# Patient Record
Sex: FEMALE | Race: BLACK OR AFRICAN AMERICAN | NOT HISPANIC OR LATINO | URBAN - METROPOLITAN AREA
[De-identification: names, ages, dates, MRNs, and addresses within clinical notes are randomized per-mention and may not be internally consistent; named-entity substitution may affect disease eponyms.]

---

## 2018-05-14 ENCOUNTER — OUTPATIENT (OUTPATIENT)
Dept: OUTPATIENT SERVICES | Facility: HOSPITAL | Age: 63
LOS: 1 days | End: 2018-05-14
Payer: COMMERCIAL

## 2018-05-14 DIAGNOSIS — Z22.321 CARRIER OR SUSPECTED CARRIER OF METHICILLIN SUSCEPTIBLE STAPHYLOCOCCUS AUREUS: ICD-10-CM

## 2018-05-14 LAB
MRSA PCR RESULT.: NEGATIVE — SIGNIFICANT CHANGE UP
S AUREUS DNA NOSE QL NAA+PROBE: NEGATIVE — SIGNIFICANT CHANGE UP

## 2018-05-14 PROCEDURE — 87641 MR-STAPH DNA AMP PROBE: CPT

## 2018-05-17 VITALS
HEART RATE: 64 BPM | SYSTOLIC BLOOD PRESSURE: 103 MMHG | DIASTOLIC BLOOD PRESSURE: 54 MMHG | OXYGEN SATURATION: 100 % | TEMPERATURE: 97 F | RESPIRATION RATE: 18 BRPM | WEIGHT: 169.32 LBS | HEIGHT: 65 IN

## 2018-05-17 NOTE — PATIENT PROFILE ADULT. - PMH
Hyperlipidemia    Hypothyroidism    Migraines    Osteoarthritis    Rotator cuff disorder  chronic injury Hyperlipidemia    Hypothyroidism    Migraines    Osteoarthritis    PUD (peptic ulcer disease)  with ugi blding  Rotator cuff disorder  chronic injury Hyperlipidemia    Hypothyroidism    Migraines    Osteoarthritis    PUD (peptic ulcer disease)  with ugi blding  Rotator cuff disorder  chronic injury left

## 2018-05-17 NOTE — PATIENT PROFILE ADULT. - PSH
History of cataract surgery    History of hemorrhoidectomy History of 2  sections    History of cataract surgery  bilateral  History of hemorrhoidectomy

## 2018-05-17 NOTE — H&P ADULT - HISTORY OF PRESENT ILLNESS
63F c/o right knee pain  Presents today for elective right TKR. 63F c/o right knee pain x chronic.   Has had conservative treatment but still symptomatic.  Presents today for elective right Total Knee Replacement surgery.    Independent ambulator. No numbness of LE. No other complaints.

## 2018-05-17 NOTE — H&P ADULT - NSHPLABSRESULTS_GEN_ALL_CORE
Preop cbc, bmp, pt/inr, ptt, ua wnl; nasal swab neg  preop ekg wnl per clearance   ct chest 10/2017 wnl per clearance

## 2018-05-17 NOTE — H&P ADULT - NSHPPHYSICALEXAM_GEN_ALL_CORE
Right knee decreased ROM secondary to pain  Rest of PE per MD clearance NAD  MSK:  Right knee decreased ROM secondary to pain  ehl, tib ant, GS 5/5 b/l  DP right 2+  gross sensation intact to light touch b/l LE  calf soft, compressible b/l LE    Rest of PE per MD clearance

## 2018-05-18 ENCOUNTER — INPATIENT (INPATIENT)
Facility: HOSPITAL | Age: 63
LOS: 3 days | Discharge: HOME HEALTH | DRG: 470 | End: 2018-05-22
Attending: SPECIALIST | Admitting: SPECIALIST
Payer: COMMERCIAL

## 2018-05-18 DIAGNOSIS — K27.9 PEPTIC ULCER, SITE UNSPECIFIED, UNSPECIFIED AS ACUTE OR CHRONIC, WITHOUT HEMORRHAGE OR PERFORATION: ICD-10-CM

## 2018-05-18 DIAGNOSIS — E03.9 HYPOTHYROIDISM, UNSPECIFIED: ICD-10-CM

## 2018-05-18 DIAGNOSIS — Z87.59 PERSONAL HISTORY OF OTHER COMPLICATIONS OF PREGNANCY, CHILDBIRTH AND THE PUERPERIUM: Chronic | ICD-10-CM

## 2018-05-18 DIAGNOSIS — G43.909 MIGRAINE, UNSPECIFIED, NOT INTRACTABLE, WITHOUT STATUS MIGRAINOSUS: ICD-10-CM

## 2018-05-18 DIAGNOSIS — M17.11 UNILATERAL PRIMARY OSTEOARTHRITIS, RIGHT KNEE: ICD-10-CM

## 2018-05-18 DIAGNOSIS — E78.5 HYPERLIPIDEMIA, UNSPECIFIED: ICD-10-CM

## 2018-05-18 DIAGNOSIS — Z98.49 CATARACT EXTRACTION STATUS, UNSPECIFIED EYE: Chronic | ICD-10-CM

## 2018-05-18 DIAGNOSIS — Z98.890 OTHER SPECIFIED POSTPROCEDURAL STATES: Chronic | ICD-10-CM

## 2018-05-18 PROCEDURE — 73560 X-RAY EXAM OF KNEE 1 OR 2: CPT | Mod: 26,RT

## 2018-05-18 RX ORDER — ONDANSETRON 8 MG/1
4 TABLET, FILM COATED ORAL EVERY 6 HOURS
Qty: 0 | Refills: 0 | Status: DISCONTINUED | OUTPATIENT
Start: 2018-05-18 | End: 2018-05-22

## 2018-05-18 RX ORDER — OXYCODONE HYDROCHLORIDE 5 MG/1
10 TABLET ORAL EVERY 4 HOURS
Qty: 0 | Refills: 0 | Status: DISCONTINUED | OUTPATIENT
Start: 2018-05-18 | End: 2018-05-18

## 2018-05-18 RX ORDER — CELECOXIB 200 MG/1
200 CAPSULE ORAL
Qty: 0 | Refills: 0 | Status: DISCONTINUED | OUTPATIENT
Start: 2018-05-20 | End: 2018-05-22

## 2018-05-18 RX ORDER — ACETAMINOPHEN 500 MG
650 TABLET ORAL EVERY 6 HOURS
Qty: 0 | Refills: 0 | Status: DISCONTINUED | OUTPATIENT
Start: 2018-05-18 | End: 2018-05-22

## 2018-05-18 RX ORDER — LEVOTHYROXINE SODIUM 125 MCG
112 TABLET ORAL DAILY
Qty: 0 | Refills: 0 | Status: DISCONTINUED | OUTPATIENT
Start: 2018-05-18 | End: 2018-05-22

## 2018-05-18 RX ORDER — OXYCODONE HYDROCHLORIDE 5 MG/1
10 TABLET ORAL EVERY 4 HOURS
Qty: 0 | Refills: 0 | Status: DISCONTINUED | OUTPATIENT
Start: 2018-05-18 | End: 2018-05-22

## 2018-05-18 RX ORDER — HYDROMORPHONE HYDROCHLORIDE 2 MG/ML
0.5 INJECTION INTRAMUSCULAR; INTRAVENOUS; SUBCUTANEOUS EVERY 4 HOURS
Qty: 0 | Refills: 0 | Status: DISCONTINUED | OUTPATIENT
Start: 2018-05-18 | End: 2018-05-18

## 2018-05-18 RX ORDER — HYDROMORPHONE HYDROCHLORIDE 2 MG/ML
0.5 INJECTION INTRAMUSCULAR; INTRAVENOUS; SUBCUTANEOUS
Qty: 0 | Refills: 0 | Status: DISCONTINUED | OUTPATIENT
Start: 2018-05-18 | End: 2018-05-22

## 2018-05-18 RX ORDER — MAGNESIUM HYDROXIDE 400 MG/1
30 TABLET, CHEWABLE ORAL DAILY
Qty: 0 | Refills: 0 | Status: DISCONTINUED | OUTPATIENT
Start: 2018-05-18 | End: 2018-05-22

## 2018-05-18 RX ORDER — OXYCODONE HYDROCHLORIDE 5 MG/1
5 TABLET ORAL EVERY 4 HOURS
Qty: 0 | Refills: 0 | Status: DISCONTINUED | OUTPATIENT
Start: 2018-05-18 | End: 2018-05-18

## 2018-05-18 RX ORDER — CEFAZOLIN SODIUM 1 G
2000 VIAL (EA) INJECTION EVERY 8 HOURS
Qty: 0 | Refills: 0 | Status: DISCONTINUED | OUTPATIENT
Start: 2018-05-18 | End: 2018-05-18

## 2018-05-18 RX ORDER — PANTOPRAZOLE SODIUM 20 MG/1
40 TABLET, DELAYED RELEASE ORAL DAILY
Qty: 0 | Refills: 0 | Status: DISCONTINUED | OUTPATIENT
Start: 2018-05-18 | End: 2018-05-22

## 2018-05-18 RX ORDER — CELECOXIB 200 MG/1
400 CAPSULE ORAL ONCE
Qty: 0 | Refills: 0 | Status: COMPLETED | OUTPATIENT
Start: 2018-05-18 | End: 2018-05-18

## 2018-05-18 RX ORDER — ACETAMINOPHEN 500 MG
650 TABLET ORAL EVERY 6 HOURS
Qty: 0 | Refills: 0 | Status: DISCONTINUED | OUTPATIENT
Start: 2018-05-18 | End: 2018-05-21

## 2018-05-18 RX ORDER — CEFAZOLIN SODIUM 1 G
2000 VIAL (EA) INJECTION EVERY 8 HOURS
Qty: 0 | Refills: 0 | Status: COMPLETED | OUTPATIENT
Start: 2018-05-18 | End: 2018-05-19

## 2018-05-18 RX ORDER — LEVOTHYROXINE SODIUM 125 MCG
1 TABLET ORAL
Qty: 0 | Refills: 0 | COMMUNITY

## 2018-05-18 RX ORDER — HYDROMORPHONE HYDROCHLORIDE 2 MG/ML
0.5 INJECTION INTRAMUSCULAR; INTRAVENOUS; SUBCUTANEOUS EVERY 4 HOURS
Qty: 0 | Refills: 0 | Status: DISCONTINUED | OUTPATIENT
Start: 2018-05-18 | End: 2018-05-22

## 2018-05-18 RX ORDER — POLYETHYLENE GLYCOL 3350 17 G/17G
17 POWDER, FOR SOLUTION ORAL DAILY
Qty: 0 | Refills: 0 | Status: DISCONTINUED | OUTPATIENT
Start: 2018-05-18 | End: 2018-05-22

## 2018-05-18 RX ORDER — SENNA PLUS 8.6 MG/1
2 TABLET ORAL AT BEDTIME
Qty: 0 | Refills: 0 | Status: DISCONTINUED | OUTPATIENT
Start: 2018-05-18 | End: 2018-05-22

## 2018-05-18 RX ORDER — ASPIRIN/CALCIUM CARB/MAGNESIUM 324 MG
325 TABLET ORAL
Qty: 0 | Refills: 0 | Status: DISCONTINUED | OUTPATIENT
Start: 2018-05-18 | End: 2018-05-22

## 2018-05-18 RX ORDER — DOCUSATE SODIUM 100 MG
100 CAPSULE ORAL THREE TIMES A DAY
Qty: 0 | Refills: 0 | Status: DISCONTINUED | OUTPATIENT
Start: 2018-05-18 | End: 2018-05-22

## 2018-05-18 RX ORDER — OXYCODONE HYDROCHLORIDE 5 MG/1
5 TABLET ORAL EVERY 4 HOURS
Qty: 0 | Refills: 0 | Status: DISCONTINUED | OUTPATIENT
Start: 2018-05-18 | End: 2018-05-22

## 2018-05-18 RX ORDER — OXYCODONE HYDROCHLORIDE 5 MG/1
20 TABLET ORAL ONCE
Qty: 0 | Refills: 0 | Status: DISCONTINUED | OUTPATIENT
Start: 2018-05-18 | End: 2018-05-18

## 2018-05-18 RX ORDER — SODIUM CHLORIDE 9 MG/ML
1000 INJECTION, SOLUTION INTRAVENOUS
Qty: 0 | Refills: 0 | Status: DISCONTINUED | OUTPATIENT
Start: 2018-05-18 | End: 2018-05-19

## 2018-05-18 RX ADMIN — CELECOXIB 400 MILLIGRAM(S): 200 CAPSULE ORAL at 07:37

## 2018-05-18 RX ADMIN — OXYCODONE HYDROCHLORIDE 10 MILLIGRAM(S): 5 TABLET ORAL at 16:11

## 2018-05-18 RX ADMIN — Medication 100 MILLIGRAM(S): at 20:43

## 2018-05-18 RX ADMIN — Medication 100 MILLIGRAM(S): at 16:11

## 2018-05-18 RX ADMIN — Medication 325 MILLIGRAM(S): at 16:12

## 2018-05-18 RX ADMIN — OXYCODONE HYDROCHLORIDE 20 MILLIGRAM(S): 5 TABLET ORAL at 07:37

## 2018-05-18 RX ADMIN — OXYCODONE HYDROCHLORIDE 10 MILLIGRAM(S): 5 TABLET ORAL at 17:11

## 2018-05-18 RX ADMIN — HYDROMORPHONE HYDROCHLORIDE 0.5 MILLIGRAM(S): 2 INJECTION INTRAMUSCULAR; INTRAVENOUS; SUBCUTANEOUS at 14:08

## 2018-05-18 RX ADMIN — OXYCODONE HYDROCHLORIDE 10 MILLIGRAM(S): 5 TABLET ORAL at 20:43

## 2018-05-18 RX ADMIN — OXYCODONE HYDROCHLORIDE 20 MILLIGRAM(S): 5 TABLET ORAL at 10:55

## 2018-05-18 RX ADMIN — CELECOXIB 400 MILLIGRAM(S): 200 CAPSULE ORAL at 10:55

## 2018-05-18 RX ADMIN — OXYCODONE HYDROCHLORIDE 10 MILLIGRAM(S): 5 TABLET ORAL at 21:43

## 2018-05-18 NOTE — PROGRESS NOTE ADULT - SUBJECTIVE AND OBJECTIVE BOX
Ortho Post Op Check    Procedure: Right TKR, Left knee inj  Surgeon: Dr. Senior    Pt comfortable without complaints, pain controlled  Denies CP, SOB, N/V, numbness/tingling     Vital Signs Last 24 Hrs  T(C): 36.8 (05-18-18 @ 14:57), Max: 37.7 (05-18-18 @ 13:00)  T(F): 98.2 (05-18-18 @ 14:57), Max: 99.9 (05-18-18 @ 13:00)  HR: 69 (05-18-18 @ 14:57) (40 - 69)  BP: 130/79 (05-18-18 @ 14:57) (113/63 - 144/71)  BP(mean): 89 (05-18-18 @ 14:00) (77 - 101)  RR: 16 (05-18-18 @ 14:57) (11 - 24)  SpO2: 99% (05-18-18 @ 14:57) (93% - 100%)  AVSS    General: Pt Alert and oriented, NAD  DSG C/D/I b/l knee  Pulses intact RLE  Sensation intact RLE  Motor: EHL/FHL/TA/GS 5/5 RLE    Post-op X-Ray: prosthesis in place    A/P: 63yFemale POD#0 s/p Right TKR, Left knee inj  - Stable  - Pain Control  - DVT ppx: asa  - Post op abx: ancef  - PT, WBS: wbat    Ortho Pager [phone] Ortho Post Op Check    Procedure: Right TKR, Left knee inj  Surgeon: Dr. Senior    Pt comfortable without complaints, pain controlled  Denies CP, SOB, N/V, numbness/tingling     Vital Signs Last 24 Hrs  T(C): 36.8 (05-18-18 @ 14:57), Max: 37.7 (05-18-18 @ 13:00)  T(F): 98.2 (05-18-18 @ 14:57), Max: 99.9 (05-18-18 @ 13:00)  HR: 69 (05-18-18 @ 14:57) (40 - 69)  BP: 130/79 (05-18-18 @ 14:57) (113/63 - 144/71)  BP(mean): 89 (05-18-18 @ 14:00) (77 - 101)  RR: 16 (05-18-18 @ 14:57) (11 - 24)  SpO2: 99% (05-18-18 @ 14:57) (93% - 100%)  AVSS    General: Pt Alert and oriented, NAD  DSG C/D/I b/l knee  Pulses intact b/l LE  Sensation intact b/l LE  Motor: EHL/FHL/TA/GS 5/5 b/l    Post-op X-Ray: prosthesis in place    A/P: 63yFemale POD#0 s/p Right TKR, Left knee inj  - Stable  - Pain Control  - DVT ppx: asa  - Post op abx: ancef  - PT, WBS: wbat    Ortho Pager [phone]

## 2018-05-18 NOTE — BRIEF OPERATIVE NOTE - PROCEDURE
<<-----Click on this checkbox to enter Procedure Total knee arthroplasty  05/18/2018    Active  ANTHONY

## 2018-05-18 NOTE — PHYSICAL THERAPY INITIAL EVALUATION ADULT - ADDITIONAL COMMENTS
Pt lives in elevator building with 29 y/o son. Pt owns all equipment needed, walker, cane, raised toilet seat.

## 2018-05-19 LAB
ANION GAP SERPL CALC-SCNC: 11 MMOL/L — SIGNIFICANT CHANGE UP (ref 5–17)
BUN SERPL-MCNC: 11 MG/DL — SIGNIFICANT CHANGE UP (ref 7–23)
CALCIUM SERPL-MCNC: 8.8 MG/DL — SIGNIFICANT CHANGE UP (ref 8.4–10.5)
CHLORIDE SERPL-SCNC: 96 MMOL/L — SIGNIFICANT CHANGE UP (ref 96–108)
CO2 SERPL-SCNC: 24 MMOL/L — SIGNIFICANT CHANGE UP (ref 22–31)
CREAT SERPL-MCNC: 0.64 MG/DL — SIGNIFICANT CHANGE UP (ref 0.5–1.3)
GLUCOSE SERPL-MCNC: 128 MG/DL — HIGH (ref 70–99)
HCT VFR BLD CALC: 35.5 % — SIGNIFICANT CHANGE UP (ref 34.5–45)
HGB BLD-MCNC: 11.6 G/DL — SIGNIFICANT CHANGE UP (ref 11.5–15.5)
MCHC RBC-ENTMCNC: 31.4 PG — SIGNIFICANT CHANGE UP (ref 27–34)
MCHC RBC-ENTMCNC: 32.7 G/DL — SIGNIFICANT CHANGE UP (ref 32–36)
MCV RBC AUTO: 95.9 FL — SIGNIFICANT CHANGE UP (ref 80–100)
PLATELET # BLD AUTO: 220 K/UL — SIGNIFICANT CHANGE UP (ref 150–400)
POTASSIUM SERPL-MCNC: 3.9 MMOL/L — SIGNIFICANT CHANGE UP (ref 3.5–5.3)
POTASSIUM SERPL-SCNC: 3.9 MMOL/L — SIGNIFICANT CHANGE UP (ref 3.5–5.3)
RBC # BLD: 3.7 M/UL — LOW (ref 3.8–5.2)
RBC # FLD: 12.8 % — SIGNIFICANT CHANGE UP (ref 10.3–16.9)
SODIUM SERPL-SCNC: 131 MMOL/L — LOW (ref 135–145)
WBC # BLD: 9.6 K/UL — SIGNIFICANT CHANGE UP (ref 3.8–10.5)
WBC # FLD AUTO: 9.6 K/UL — SIGNIFICANT CHANGE UP (ref 3.8–10.5)

## 2018-05-19 RX ORDER — KETOROLAC TROMETHAMINE 30 MG/ML
30 SYRINGE (ML) INJECTION EVERY 6 HOURS
Qty: 0 | Refills: 0 | Status: DISCONTINUED | OUTPATIENT
Start: 2018-05-19 | End: 2018-05-22

## 2018-05-19 RX ADMIN — OXYCODONE HYDROCHLORIDE 10 MILLIGRAM(S): 5 TABLET ORAL at 05:04

## 2018-05-19 RX ADMIN — HYDROMORPHONE HYDROCHLORIDE 0.5 MILLIGRAM(S): 2 INJECTION INTRAMUSCULAR; INTRAVENOUS; SUBCUTANEOUS at 11:48

## 2018-05-19 RX ADMIN — Medication 100 MILLIGRAM(S): at 00:12

## 2018-05-19 RX ADMIN — OXYCODONE HYDROCHLORIDE 10 MILLIGRAM(S): 5 TABLET ORAL at 01:32

## 2018-05-19 RX ADMIN — Medication 650 MILLIGRAM(S): at 18:30

## 2018-05-19 RX ADMIN — Medication 100 MILLIGRAM(S): at 05:04

## 2018-05-19 RX ADMIN — Medication 100 MILLIGRAM(S): at 13:25

## 2018-05-19 RX ADMIN — OXYCODONE HYDROCHLORIDE 10 MILLIGRAM(S): 5 TABLET ORAL at 01:00

## 2018-05-19 RX ADMIN — Medication 325 MILLIGRAM(S): at 05:04

## 2018-05-19 RX ADMIN — HYDROMORPHONE HYDROCHLORIDE 0.5 MILLIGRAM(S): 2 INJECTION INTRAMUSCULAR; INTRAVENOUS; SUBCUTANEOUS at 02:06

## 2018-05-19 RX ADMIN — HYDROMORPHONE HYDROCHLORIDE 0.5 MILLIGRAM(S): 2 INJECTION INTRAMUSCULAR; INTRAVENOUS; SUBCUTANEOUS at 02:21

## 2018-05-19 RX ADMIN — Medication 650 MILLIGRAM(S): at 17:34

## 2018-05-19 RX ADMIN — Medication 30 MILLIGRAM(S): at 17:34

## 2018-05-19 RX ADMIN — POLYETHYLENE GLYCOL 3350 17 GRAM(S): 17 POWDER, FOR SOLUTION ORAL at 09:49

## 2018-05-19 RX ADMIN — HYDROMORPHONE HYDROCHLORIDE 0.5 MILLIGRAM(S): 2 INJECTION INTRAMUSCULAR; INTRAVENOUS; SUBCUTANEOUS at 12:02

## 2018-05-19 RX ADMIN — Medication 30 MILLIGRAM(S): at 17:45

## 2018-05-19 RX ADMIN — Medication 100 MILLIGRAM(S): at 23:20

## 2018-05-19 RX ADMIN — OXYCODONE HYDROCHLORIDE 10 MILLIGRAM(S): 5 TABLET ORAL at 10:40

## 2018-05-19 RX ADMIN — OXYCODONE HYDROCHLORIDE 10 MILLIGRAM(S): 5 TABLET ORAL at 09:49

## 2018-05-19 RX ADMIN — Medication 112 MICROGRAM(S): at 04:29

## 2018-05-19 RX ADMIN — OXYCODONE HYDROCHLORIDE 10 MILLIGRAM(S): 5 TABLET ORAL at 06:00

## 2018-05-19 RX ADMIN — Medication 325 MILLIGRAM(S): at 17:34

## 2018-05-19 RX ADMIN — PANTOPRAZOLE SODIUM 40 MILLIGRAM(S): 20 TABLET, DELAYED RELEASE ORAL at 05:04

## 2018-05-19 NOTE — PROGRESS NOTE ADULT - SUBJECTIVE AND OBJECTIVE BOX
Ortho    Pt comfortable without complaints, pain controlled  Denies CP, SOB, N/V, numbness/tingling     Vital Signs Last 24 Hrs  T(C): 36.9 (19 May 2018 05:00), Max: 37.7 (18 May 2018 13:00)  T(F): 98.5 (19 May 2018 05:00), Max: 99.9 (18 May 2018 13:00)  HR: 63 (19 May 2018 05:00) (40 - 84)  BP: 150/66 (19 May 2018 05:00) (113/63 - 153/80)  BP(mean): 89 (18 May 2018 14:00) (77 - 101)  RR: 16 (19 May 2018 05:00) (11 - 24)  SpO2: 99% (19 May 2018 05:00) (93% - 100%)    General: Pt Alert and oriented, NAD  DSG c/d/i  Pulses intact b/l LE  Sensation intact b/l LE  Motor: EHL/FHL/TA/GS 5/5 b/l    A/P: 63yFemale s/p Right TKR, Left knee inj  - Stable  - Pain Control  - DVT ppx: asa  - Post op abx: ancef  - PT, WBS: wbat    Ortho Pager [phone]

## 2018-05-20 LAB
ANION GAP SERPL CALC-SCNC: 13 MMOL/L — SIGNIFICANT CHANGE UP (ref 5–17)
BUN SERPL-MCNC: 10 MG/DL — SIGNIFICANT CHANGE UP (ref 7–23)
CALCIUM SERPL-MCNC: 9.1 MG/DL — SIGNIFICANT CHANGE UP (ref 8.4–10.5)
CHLORIDE SERPL-SCNC: 91 MMOL/L — LOW (ref 96–108)
CO2 SERPL-SCNC: 24 MMOL/L — SIGNIFICANT CHANGE UP (ref 22–31)
CREAT SERPL-MCNC: 0.78 MG/DL — SIGNIFICANT CHANGE UP (ref 0.5–1.3)
GLUCOSE SERPL-MCNC: 109 MG/DL — HIGH (ref 70–99)
HCT VFR BLD CALC: 34.6 % — SIGNIFICANT CHANGE UP (ref 34.5–45)
HGB BLD-MCNC: 11.6 G/DL — SIGNIFICANT CHANGE UP (ref 11.5–15.5)
MCHC RBC-ENTMCNC: 30.9 PG — SIGNIFICANT CHANGE UP (ref 27–34)
MCHC RBC-ENTMCNC: 33.5 G/DL — SIGNIFICANT CHANGE UP (ref 32–36)
MCV RBC AUTO: 92 FL — SIGNIFICANT CHANGE UP (ref 80–100)
PLATELET # BLD AUTO: 226 K/UL — SIGNIFICANT CHANGE UP (ref 150–400)
POTASSIUM SERPL-MCNC: 4 MMOL/L — SIGNIFICANT CHANGE UP (ref 3.5–5.3)
POTASSIUM SERPL-SCNC: 4 MMOL/L — SIGNIFICANT CHANGE UP (ref 3.5–5.3)
RBC # BLD: 3.76 M/UL — LOW (ref 3.8–5.2)
RBC # FLD: 12.8 % — SIGNIFICANT CHANGE UP (ref 10.3–16.9)
SODIUM SERPL-SCNC: 128 MMOL/L — LOW (ref 135–145)
WBC # BLD: 7.9 K/UL — SIGNIFICANT CHANGE UP (ref 3.8–10.5)
WBC # FLD AUTO: 7.9 K/UL — SIGNIFICANT CHANGE UP (ref 3.8–10.5)

## 2018-05-20 RX ADMIN — OXYCODONE HYDROCHLORIDE 5 MILLIGRAM(S): 5 TABLET ORAL at 18:44

## 2018-05-20 RX ADMIN — Medication 100 MILLIGRAM(S): at 05:31

## 2018-05-20 RX ADMIN — Medication 100 MILLIGRAM(S): at 14:53

## 2018-05-20 RX ADMIN — Medication 100 MILLIGRAM(S): at 21:05

## 2018-05-20 RX ADMIN — OXYCODONE HYDROCHLORIDE 5 MILLIGRAM(S): 5 TABLET ORAL at 11:12

## 2018-05-20 RX ADMIN — Medication 650 MILLIGRAM(S): at 11:13

## 2018-05-20 RX ADMIN — CELECOXIB 200 MILLIGRAM(S): 200 CAPSULE ORAL at 18:38

## 2018-05-20 RX ADMIN — Medication 325 MILLIGRAM(S): at 18:38

## 2018-05-20 RX ADMIN — OXYCODONE HYDROCHLORIDE 5 MILLIGRAM(S): 5 TABLET ORAL at 11:40

## 2018-05-20 RX ADMIN — Medication 325 MILLIGRAM(S): at 05:30

## 2018-05-20 RX ADMIN — CELECOXIB 200 MILLIGRAM(S): 200 CAPSULE ORAL at 05:30

## 2018-05-20 RX ADMIN — Medication 112 MICROGRAM(S): at 05:30

## 2018-05-20 RX ADMIN — PANTOPRAZOLE SODIUM 40 MILLIGRAM(S): 20 TABLET, DELAYED RELEASE ORAL at 05:30

## 2018-05-20 RX ADMIN — Medication 650 MILLIGRAM(S): at 05:30

## 2018-05-20 RX ADMIN — OXYCODONE HYDROCHLORIDE 5 MILLIGRAM(S): 5 TABLET ORAL at 19:30

## 2018-05-20 NOTE — PROGRESS NOTE ADULT - SUBJECTIVE AND OBJECTIVE BOX
Ortho    Pt comfortable without complaints, pain controlled  Denies CP, SOB, N/V, numbness/tingling     Vital Signs Last 24 Hrs  T(C): 37.3 (20 May 2018 05:26), Max: 37.4 (19 May 2018 20:49)  T(F): 99.2 (20 May 2018 05:26), Max: 99.4 (19 May 2018 20:49)  HR: 82 (20 May 2018 05:26) (67 - 82)  BP: 137/65 (20 May 2018 05:26) (97/61 - 161/77)  BP(mean): --  RR: 17 (20 May 2018 05:26) (15 - 17)  SpO2: 96% (20 May 2018 05:26) (96% - 100%)    General: Pt Alert and oriented, NAD  DSG c/d/i  Pulses intact b/l LE  Sensation intact b/l LE  Motor: EHL/FHL/TA/GS 5/5 b/l    A/P: 63yFemale s/p Right TKR, Left knee inj  - Stable  - Pain Control  - DVT ppx: asa  - Post op abx: ancef  - PT, WBS: wbat    Ortho Pager [phone]

## 2018-05-21 LAB
ANION GAP SERPL CALC-SCNC: 12 MMOL/L — SIGNIFICANT CHANGE UP (ref 5–17)
BUN SERPL-MCNC: 14 MG/DL — SIGNIFICANT CHANGE UP (ref 7–23)
CALCIUM SERPL-MCNC: 9.5 MG/DL — SIGNIFICANT CHANGE UP (ref 8.4–10.5)
CHLORIDE SERPL-SCNC: 100 MMOL/L — SIGNIFICANT CHANGE UP (ref 96–108)
CO2 SERPL-SCNC: 27 MMOL/L — SIGNIFICANT CHANGE UP (ref 22–31)
CREAT SERPL-MCNC: 0.76 MG/DL — SIGNIFICANT CHANGE UP (ref 0.5–1.3)
GLUCOSE SERPL-MCNC: 111 MG/DL — HIGH (ref 70–99)
HCT VFR BLD CALC: 37.9 % — SIGNIFICANT CHANGE UP (ref 34.5–45)
HGB BLD-MCNC: 12.5 G/DL — SIGNIFICANT CHANGE UP (ref 11.5–15.5)
MCHC RBC-ENTMCNC: 31.6 PG — SIGNIFICANT CHANGE UP (ref 27–34)
MCHC RBC-ENTMCNC: 33 G/DL — SIGNIFICANT CHANGE UP (ref 32–36)
MCV RBC AUTO: 95.7 FL — SIGNIFICANT CHANGE UP (ref 80–100)
PLATELET # BLD AUTO: 233 K/UL — SIGNIFICANT CHANGE UP (ref 150–400)
POTASSIUM SERPL-MCNC: 4 MMOL/L — SIGNIFICANT CHANGE UP (ref 3.5–5.3)
POTASSIUM SERPL-SCNC: 4 MMOL/L — SIGNIFICANT CHANGE UP (ref 3.5–5.3)
RBC # BLD: 3.96 M/UL — SIGNIFICANT CHANGE UP (ref 3.8–5.2)
RBC # FLD: 13 % — SIGNIFICANT CHANGE UP (ref 10.3–16.9)
SODIUM SERPL-SCNC: 139 MMOL/L — SIGNIFICANT CHANGE UP (ref 135–145)
SURGICAL PATHOLOGY STUDY: SIGNIFICANT CHANGE UP
WBC # BLD: 7.6 K/UL — SIGNIFICANT CHANGE UP (ref 3.8–10.5)
WBC # FLD AUTO: 7.6 K/UL — SIGNIFICANT CHANGE UP (ref 3.8–10.5)

## 2018-05-21 RX ORDER — MAGNESIUM HYDROXIDE 400 MG/1
30 TABLET, CHEWABLE ORAL DAILY
Qty: 0 | Refills: 0 | Status: DISCONTINUED | OUTPATIENT
Start: 2018-05-21 | End: 2018-05-22

## 2018-05-21 RX ORDER — DOCUSATE SODIUM 100 MG
1 CAPSULE ORAL
Qty: 0 | Refills: 0 | COMMUNITY
Start: 2018-05-21

## 2018-05-21 RX ORDER — SODIUM CHLORIDE 9 MG/ML
500 INJECTION INTRAMUSCULAR; INTRAVENOUS; SUBCUTANEOUS ONCE
Qty: 0 | Refills: 0 | Status: COMPLETED | OUTPATIENT
Start: 2018-05-21 | End: 2018-05-21

## 2018-05-21 RX ORDER — SENNA PLUS 8.6 MG/1
2 TABLET ORAL
Qty: 0 | Refills: 0 | COMMUNITY
Start: 2018-05-21

## 2018-05-21 RX ORDER — ASPIRIN/CALCIUM CARB/MAGNESIUM 324 MG
1 TABLET ORAL
Qty: 60 | Refills: 0 | OUTPATIENT
Start: 2018-05-21 | End: 2018-06-19

## 2018-05-21 RX ORDER — MELOXICAM 15 MG/1
1 TABLET ORAL
Qty: 30 | Refills: 0 | OUTPATIENT
Start: 2018-05-21 | End: 2018-06-19

## 2018-05-21 RX ORDER — ACETAMINOPHEN 500 MG
975 TABLET ORAL EVERY 8 HOURS
Qty: 0 | Refills: 0 | Status: DISCONTINUED | OUTPATIENT
Start: 2018-05-21 | End: 2018-05-22

## 2018-05-21 RX ADMIN — Medication 112 MICROGRAM(S): at 05:42

## 2018-05-21 RX ADMIN — CELECOXIB 200 MILLIGRAM(S): 200 CAPSULE ORAL at 05:42

## 2018-05-21 RX ADMIN — Medication 650 MILLIGRAM(S): at 06:20

## 2018-05-21 RX ADMIN — CELECOXIB 200 MILLIGRAM(S): 200 CAPSULE ORAL at 06:20

## 2018-05-21 RX ADMIN — Medication 325 MILLIGRAM(S): at 05:42

## 2018-05-21 RX ADMIN — CELECOXIB 200 MILLIGRAM(S): 200 CAPSULE ORAL at 17:27

## 2018-05-21 RX ADMIN — Medication 100 MILLIGRAM(S): at 05:42

## 2018-05-21 RX ADMIN — PANTOPRAZOLE SODIUM 40 MILLIGRAM(S): 20 TABLET, DELAYED RELEASE ORAL at 05:43

## 2018-05-21 RX ADMIN — Medication 975 MILLIGRAM(S): at 22:00

## 2018-05-21 RX ADMIN — Medication 100 MILLIGRAM(S): at 21:11

## 2018-05-21 RX ADMIN — MAGNESIUM HYDROXIDE 30 MILLILITER(S): 400 TABLET, CHEWABLE ORAL at 21:11

## 2018-05-21 RX ADMIN — POLYETHYLENE GLYCOL 3350 17 GRAM(S): 17 POWDER, FOR SOLUTION ORAL at 11:59

## 2018-05-21 RX ADMIN — SODIUM CHLORIDE 1000 MILLILITER(S): 9 INJECTION INTRAMUSCULAR; INTRAVENOUS; SUBCUTANEOUS at 11:59

## 2018-05-21 RX ADMIN — Medication 975 MILLIGRAM(S): at 11:59

## 2018-05-21 RX ADMIN — Medication 975 MILLIGRAM(S): at 13:30

## 2018-05-21 RX ADMIN — Medication 975 MILLIGRAM(S): at 21:11

## 2018-05-21 RX ADMIN — Medication 650 MILLIGRAM(S): at 05:42

## 2018-05-21 RX ADMIN — SENNA PLUS 2 TABLET(S): 8.6 TABLET ORAL at 21:11

## 2018-05-21 RX ADMIN — Medication 325 MILLIGRAM(S): at 17:27

## 2018-05-21 RX ADMIN — SODIUM CHLORIDE 1000 MILLILITER(S): 9 INJECTION INTRAMUSCULAR; INTRAVENOUS; SUBCUTANEOUS at 09:30

## 2018-05-21 RX ADMIN — Medication 100 MILLIGRAM(S): at 11:59

## 2018-05-21 NOTE — DISCHARGE NOTE ADULT - CARE PROVIDERS DIRECT ADDRESSES
[email or web address]

## 2018-05-21 NOTE — DISCHARGE NOTE ADULT - CARE PROVIDER_API CALL
Art Senior), Orthopedics  [address] [place] [id number]  Phone: [phone]  Fax: [phone]

## 2018-05-21 NOTE — DISCHARGE NOTE ADULT - PATIENT PORTAL LINK FT
You can access the [facility] Portal, offered by [facility], by registering with the following website: [email or web address]

## 2018-05-21 NOTE — PROGRESS NOTE ADULT - SUBJECTIVE AND OBJECTIVE BOX
ORTHO NOTE    [ x] Pt seen/examined at 850am.  [ ] Pt without any complaints/in NAD.    [x ] Pt complains of: orthostatic hypotension with mild dizziness when OOB in chair this morning       ROS: [ ] Fever  [ ] Chills  [ ] CP [ ] SOB [ ] Dysnea  [ ] Palpitations [ ] Cough [ ] N/V/C/D [ ] Paresthia [ ] Other     [x ] ROS  otherwise negative    .    PHYSICAL EXAM:    Vital Signs Last 24 Hrs  T(C): 36.7 (21 May 2018 08:58), Max: 36.7 (20 May 2018 16:25)  T(F): 98 (21 May 2018 08:58), Max: 98.1 (20 May 2018 16:25)  HR: 105 (21 May 2018 08:58) (76 - 110)  BP: 88/53 (21 May 2018 08:58) (65/30 - 111/61)  BP(mean): --  RR: 16 (21 May 2018 08:58) (16 - 17)  SpO2: 99% (21 May 2018 08:58) (95% - 99%)    I&O's Detail       CAPILLARY BLOOD GLUCOSE                      Neuro: AAOx3    Lungs: CTA, IS demonstrated 	    CV:    ABD: soft non tender, +bs +flatus     Ext: R knee aquacell cdi, RLE nvid motor 5/5 ice cryocuff implemented    LABS                        12.5   7.6   )-----------( 233      ( 21 May 2018 06:21 )             37.9                                05-21    139  |  100  |  14  ----------------------------<  111<H>  4.0   |  27  |  0.76    Ca    9.5      21 May 2018 06:21        [ ] Other Labs  [ ] None ordered            Please check or [place] when present:  •  Heart Failure:    [ ] Acute        [ ]  Acute on Chronic        [ ] Chronic         [ ] Diastolic     [ ]  Combined    •  FRED:     [ ] ATN        [ ]  Renal medullary necrosis       [ ]  Renal cortical necrosis                  [ ] Other pathological Lesion:  •  CKD:  [ ] Stage I   [ ] Stage II  [ ] Stage III    [ ]Stage IV   [ ]  CKD V   [ ]  Other/Unspecified:    •  Abdominal Nutritional Status:   [ ] Malnutrition-See Nutrition note    [ ] Cachexia   [ ]  Other        [ ] Supplement ordered:            [ ] Morbid Obesity: BMI >=40         ASSESSMENT/PLAN:      STATUS POST:  R TKA POD #3  orthostatic hypotensive this morning when OOB sitting in chair associated with mild dizziness 500cc NS bolus x1 ordered, reassessment of BP to follow, pt denies sob, cp, vision changes at this time  h/h stable 12.5/37.9  hyponatremia improved Na 139 today from 128 yesterday, fluid restricted d/cd   pain regimen adjusted, added extra strength tylenol to pain regimen to assist with better pain control  bowel regimen adjusted  pending PT clearance   CONTINUE:          [ ] PT- wbat    [ ] DVT PPX- asa bid, scds    [ ] Pain Mgt- po meds    [ ] Dispo plan- home with hPT ORTHO NOTE    [ x] Pt seen/examined at 850am.  [ ] Pt without any complaints/in NAD.    [x ] Pt complains of: orthostatic hypotension with mild dizziness when OOB in chair this morning       ROS: [ ] Fever  [ ] Chills  [ ] CP [ ] SOB [ ] Dysnea  [ ] Palpitations [ ] Cough [ ] N/V/C/D [ ] Paresthia [ ] Other     [x ] ROS  otherwise negative    .    PHYSICAL EXAM:    Vital Signs Last 24 Hrs  T(C): 36.7 (21 May 2018 08:58), Max: 36.7 (20 May 2018 16:25)  T(F): 98 (21 May 2018 08:58), Max: 98.1 (20 May 2018 16:25)  HR: 105 (21 May 2018 08:58) (76 - 110)  BP: 88/53 (21 May 2018 08:58) (65/30 - 111/61)  BP(mean): --  RR: 16 (21 May 2018 08:58) (16 - 17)  SpO2: 99% (21 May 2018 08:58) (95% - 99%)    I&O's Detail       CAPILLARY BLOOD GLUCOSE                      Neuro: AAOx3    Lungs: CTA, IS demonstrated 	    CV:    ABD: soft non tender, +bs +flatus     Ext: R knee aquacell cdi, RLE nvid motor 5/5 ice cryocuff implemented    LABS                        12.5   7.6   )-----------( 233      ( 21 May 2018 06:21 )             37.9                                05-21    139  |  100  |  14  ----------------------------<  111<H>  4.0   |  27  |  0.76    Ca    9.5      21 May 2018 06:21        [ ] Other Labs  [ ] None ordered            Please check or [place] when present:  •  Heart Failure:    [ ] Acute        [ ]  Acute on Chronic        [ ] Chronic         [ ] Diastolic     [ ]  Combined    •  FRED:     [ ] ATN        [ ]  Renal medullary necrosis       [ ]  Renal cortical necrosis                  [ ] Other pathological Lesion:  •  CKD:  [ ] Stage I   [ ] Stage II  [ ] Stage III    [ ]Stage IV   [ ]  CKD V   [ ]  Other/Unspecified:    •  Abdominal Nutritional Status:   [ ] Malnutrition-See Nutrition note    [ ] Cachexia   [ ]  Other        [ ] Supplement ordered:            [ ] Morbid Obesity: BMI >=40         ASSESSMENT/PLAN:      STATUS POST:  R TKA POD #3  orthostatic hypotensive this morning when OOB sitting in chair associated with mild dizziness 500cc NS bolus x1 ordered, reassessment of BP to follow, pt denies sob, cp, vision changes at this time  h/h stable 12.5/37.9  hyponatremia improved Na 139 today from 128 yesterday, fluid restricted d/cd   pain regimen adjusted, added extra strength tylenol to pain regimen to assist with better pain control  bowel regimen adjusted  pending PT clearance   Addendum at 1315: orthostatic hypotension with PT this morning with c/o mild dizziness, 500cc NS bolus ordered, BP normalized when pt returned back to bed, no new symptoms since previous     CONTINUE:          [ ] PT- wbat    [ ] DVT PPX- asa bid, scds    [ ] Pain Mgt- po meds    [ ] Dispo plan- home with hPT ORTHO NOTE    [ x] Pt seen/examined at 850am.  [ ] Pt without any complaints/in NAD.    [x ] Pt complains of: orthostatic hypotension with mild dizziness when OOB in chair this morning       ROS: [ ] Fever  [ ] Chills  [ ] CP [ ] SOB [ ] Dysnea  [ ] Palpitations [ ] Cough [ ] N/V/C/D [ ] Paresthia [ ] Other     [x ] ROS  otherwise negative    .    PHYSICAL EXAM:    Vital Signs Last 24 Hrs  T(C): 36.7 (21 May 2018 08:58), Max: 36.7 (20 May 2018 16:25)  T(F): 98 (21 May 2018 08:58), Max: 98.1 (20 May 2018 16:25)  HR: 105 (21 May 2018 08:58) (76 - 110)  BP: 88/53 (21 May 2018 08:58) (65/30 - 111/61)  BP(mean): --  RR: 16 (21 May 2018 08:58) (16 - 17)  SpO2: 99% (21 May 2018 08:58) (95% - 99%)    I&O's Detail       CAPILLARY BLOOD GLUCOSE                      Neuro: AAOx3    Lungs: CTA, IS demonstrated 	    CV:    ABD: soft non tender, +bs +flatus     Ext: R knee aquacell cdi, RLE nvid motor 5/5 ice cryocuff implemented    LABS                        12.5   7.6   )-----------( 233      ( 21 May 2018 06:21 )             37.9                                05-21    139  |  100  |  14  ----------------------------<  111<H>  4.0   |  27  |  0.76    Ca    9.5      21 May 2018 06:21        [ ] Other Labs  [ ] None ordered            Please check or [place] when present:  •  Heart Failure:    [ ] Acute        [ ]  Acute on Chronic        [ ] Chronic         [ ] Diastolic     [ ]  Combined    •  FRED:     [ ] ATN        [ ]  Renal medullary necrosis       [ ]  Renal cortical necrosis                  [ ] Other pathological Lesion:  •  CKD:  [ ] Stage I   [ ] Stage II  [ ] Stage III    [ ]Stage IV   [ ]  CKD V   [ ]  Other/Unspecified:    •  Abdominal Nutritional Status:   [ ] Malnutrition-See Nutrition note    [ ] Cachexia   [ ]  Other        [ ] Supplement ordered:            [ ] Morbid Obesity: BMI >=40         ASSESSMENT/PLAN:      STATUS POST:  R TKA POD #3  orthostatic hypotensive this morning when OOB sitting in chair associated with mild dizziness 500cc NS bolus x1 ordered, reassessment of BP to follow, pt denies sob, cp, vision changes at this time  h/h stable 12.5/37.9  hyponatremia improved Na 139 today from 128 yesterday, fluid restricted d/cd   pain regimen adjusted, added extra strength Tylenol to pain regimen to assist with better pain control, minimize narcotics to avoid further hypotension   bowel regimen adjusted  pending PT clearance   Addendum at 1315: orthostatic hypotension with PT this morning with c/o mild dizziness, 500cc NS bolus ordered, BP normalized when pt returned back to bed, no new symptoms since previous     CONTINUE:          [ ] PT- wbat    [ ] DVT PPX- asa bid, scds    [ ] Pain Mgt- po meds    [ ] Dispo plan- home with hPT

## 2018-05-21 NOTE — DISCHARGE NOTE ADULT - MEDICATION SUMMARY - MEDICATIONS TO TAKE
I will START or STAY ON the medications listed below when I get home from the hospital:    Ecotrin 325 mg oral delayed release tablet  -- 1 tab(s) by mouth 2 times a day for 4 weeks from the date of surgery  -- Swallow whole.  Do not crush.  Take with food or milk.    -- Indication: For Prevention of blood clots    meloxicam 15 mg oral tablet  -- 1 tab(s) by mouth once a day for 4 weeks  -- Do not take this drug if you are pregnant.  Obtain medical advice before taking any non-prescription drugs as some may affect the action of this medication.  Take with food or milk.    -- Indication: For anti inflammatory    oxyCODONE-acetaminophen 5 mg-325 mg oral tablet  -- 1 tab(s) by mouth every 4 hours as needed for pain MDD:6  -- Caution federal law prohibits the transfer of this drug to any person other  than the person for whom it was prescribed.  May cause drowsiness.  Alcohol may intensify this effect.  Use care when operating dangerous machinery.  This prescription cannot be refilled.  This product contains acetaminophen.  Do not use  with any other product containing acetaminophen to prevent possible liver damage.  Using more of this medication than prescribed may cause serious breathing problems.    -- Indication: For Pain     senna oral tablet  -- 2 tab(s) by mouth once a day (at bedtime), As needed, Constipation  -- Indication: For constipation    docusate sodium 100 mg oral capsule  -- 1 cap(s) by mouth 3 times a day  -- Indication: For constipation    bisacodyl 10 mg rectal suppository  -- 1 suppository(ies) rectally once a day, As needed, If no bowel movement by postoperative day #2  -- Indication: For constipation    levothyroxine 112 mcg (0.112 mg) oral tablet  -- 1 tab(s) by mouth once a day  -- Indication: For Home med

## 2018-05-22 VITALS
OXYGEN SATURATION: 98 % | SYSTOLIC BLOOD PRESSURE: 110 MMHG | TEMPERATURE: 97 F | DIASTOLIC BLOOD PRESSURE: 75 MMHG | HEART RATE: 79 BPM | RESPIRATION RATE: 16 BRPM

## 2018-05-22 LAB
ANION GAP SERPL CALC-SCNC: 12 MMOL/L — SIGNIFICANT CHANGE UP (ref 5–17)
BUN SERPL-MCNC: 17 MG/DL — SIGNIFICANT CHANGE UP (ref 7–23)
CALCIUM SERPL-MCNC: 9 MG/DL — SIGNIFICANT CHANGE UP (ref 8.4–10.5)
CHLORIDE SERPL-SCNC: 101 MMOL/L — SIGNIFICANT CHANGE UP (ref 96–108)
CO2 SERPL-SCNC: 26 MMOL/L — SIGNIFICANT CHANGE UP (ref 22–31)
CREAT SERPL-MCNC: 0.75 MG/DL — SIGNIFICANT CHANGE UP (ref 0.5–1.3)
GLUCOSE SERPL-MCNC: 106 MG/DL — HIGH (ref 70–99)
HCT VFR BLD CALC: 33.4 % — LOW (ref 34.5–45)
HGB BLD-MCNC: 10.8 G/DL — LOW (ref 11.5–15.5)
MCHC RBC-ENTMCNC: 30.6 PG — SIGNIFICANT CHANGE UP (ref 27–34)
MCHC RBC-ENTMCNC: 32.3 G/DL — SIGNIFICANT CHANGE UP (ref 32–36)
MCV RBC AUTO: 94.6 FL — SIGNIFICANT CHANGE UP (ref 80–100)
PLATELET # BLD AUTO: 253 K/UL — SIGNIFICANT CHANGE UP (ref 150–400)
POTASSIUM SERPL-MCNC: 3.9 MMOL/L — SIGNIFICANT CHANGE UP (ref 3.5–5.3)
POTASSIUM SERPL-SCNC: 3.9 MMOL/L — SIGNIFICANT CHANGE UP (ref 3.5–5.3)
RBC # BLD: 3.53 M/UL — LOW (ref 3.8–5.2)
RBC # FLD: 13.3 % — SIGNIFICANT CHANGE UP (ref 10.3–16.9)
SODIUM SERPL-SCNC: 139 MMOL/L — SIGNIFICANT CHANGE UP (ref 135–145)
WBC # BLD: 6.8 K/UL — SIGNIFICANT CHANGE UP (ref 3.8–10.5)
WBC # FLD AUTO: 6.8 K/UL — SIGNIFICANT CHANGE UP (ref 3.8–10.5)

## 2018-05-22 PROCEDURE — C1776: CPT

## 2018-05-22 PROCEDURE — 97110 THERAPEUTIC EXERCISES: CPT

## 2018-05-22 PROCEDURE — 85027 COMPLETE CBC AUTOMATED: CPT

## 2018-05-22 PROCEDURE — 36415 COLL VENOUS BLD VENIPUNCTURE: CPT

## 2018-05-22 PROCEDURE — 88300 SURGICAL PATH GROSS: CPT

## 2018-05-22 PROCEDURE — 73560 X-RAY EXAM OF KNEE 1 OR 2: CPT

## 2018-05-22 PROCEDURE — C1713: CPT

## 2018-05-22 PROCEDURE — 97530 THERAPEUTIC ACTIVITIES: CPT

## 2018-05-22 PROCEDURE — 97116 GAIT TRAINING THERAPY: CPT

## 2018-05-22 PROCEDURE — 97161 PT EVAL LOW COMPLEX 20 MIN: CPT

## 2018-05-22 PROCEDURE — 80048 BASIC METABOLIC PNL TOTAL CA: CPT

## 2018-05-22 RX ORDER — ASPIRIN/CALCIUM CARB/MAGNESIUM 324 MG
1 TABLET ORAL
Qty: 0 | Refills: 0 | COMMUNITY

## 2018-05-22 RX ADMIN — Medication 975 MILLIGRAM(S): at 14:20

## 2018-05-22 RX ADMIN — Medication 975 MILLIGRAM(S): at 05:32

## 2018-05-22 RX ADMIN — Medication 10 MILLIGRAM(S): at 06:33

## 2018-05-22 RX ADMIN — Medication 975 MILLIGRAM(S): at 15:20

## 2018-05-22 RX ADMIN — Medication 325 MILLIGRAM(S): at 05:32

## 2018-05-22 RX ADMIN — Medication 100 MILLIGRAM(S): at 05:32

## 2018-05-22 RX ADMIN — CELECOXIB 200 MILLIGRAM(S): 200 CAPSULE ORAL at 05:32

## 2018-05-22 RX ADMIN — Medication 112 MICROGRAM(S): at 05:32

## 2018-05-22 RX ADMIN — PANTOPRAZOLE SODIUM 40 MILLIGRAM(S): 20 TABLET, DELAYED RELEASE ORAL at 05:32

## 2018-05-22 RX ADMIN — CELECOXIB 200 MILLIGRAM(S): 200 CAPSULE ORAL at 06:28

## 2018-05-22 RX ADMIN — Medication 975 MILLIGRAM(S): at 06:28

## 2018-05-22 NOTE — PROGRESS NOTE ADULT - SUBJECTIVE AND OBJECTIVE BOX
Ortho note    Pt comfortable without complaints, pain controlled  Denies CP, SOB, N/V, numbness/tingling     Vital Signs Last 24 Hrs  T(C): 35.9 (22 May 2018 08:42), Max: 37.5 (21 May 2018 20:13)  T(F): 96.7 (22 May 2018 08:42), Max: 99.5 (21 May 2018 20:13)  HR: 84 (22 May 2018 08:42) (72 - 109)  BP: 94/60 (22 May 2018 08:42) (80/48 - 121/61)  BP(mean): --  RR: 15 (22 May 2018 08:42) (15 - 17)  SpO2: 99% (22 May 2018 08:42) (96% - 100%)    General: Pt Alert and oriented, NAD  DSG c/d/i  wwp  SILT  Motor: EHL/FHL/TA/GS 5/5 b/l    A/P: 63yFemale s/p Right TKR, Left knee inj  - Stable  - Pain Control  - DVT ppx: asa  - Post op abx: ancef  - PT, WBS: wbat    Ortho Pager [phone]

## 2018-05-24 DIAGNOSIS — M17.11 UNILATERAL PRIMARY OSTEOARTHRITIS, RIGHT KNEE: ICD-10-CM

## 2018-05-24 DIAGNOSIS — E03.9 HYPOTHYROIDISM, UNSPECIFIED: ICD-10-CM

## 2018-05-24 DIAGNOSIS — H26.9 UNSPECIFIED CATARACT: ICD-10-CM

## 2018-05-24 DIAGNOSIS — E78.5 HYPERLIPIDEMIA, UNSPECIFIED: ICD-10-CM

## 2018-05-24 DIAGNOSIS — K27.9 PEPTIC ULCER, SITE UNSPECIFIED, UNSPECIFIED AS ACUTE OR CHRONIC, WITHOUT HEMORRHAGE OR PERFORATION: ICD-10-CM

## 2018-05-24 DIAGNOSIS — G43.909 MIGRAINE, UNSPECIFIED, NOT INTRACTABLE, WITHOUT STATUS MIGRAINOSUS: ICD-10-CM

## 2018-07-02 PROBLEM — M67.919 UNSPECIFIED DISORDER OF SYNOVIUM AND TENDON, UNSPECIFIED SHOULDER: Chronic | Status: ACTIVE | Noted: 2018-05-17

## 2018-07-02 PROBLEM — K27.9 PEPTIC ULCER, SITE UNSPECIFIED, UNSPECIFIED AS ACUTE OR CHRONIC, WITHOUT HEMORRHAGE OR PERFORATION: Chronic | Status: ACTIVE | Noted: 2018-05-17

## 2018-07-02 PROBLEM — E03.9 HYPOTHYROIDISM, UNSPECIFIED: Chronic | Status: ACTIVE | Noted: 2018-05-17

## 2018-07-02 PROBLEM — E78.5 HYPERLIPIDEMIA, UNSPECIFIED: Chronic | Status: ACTIVE | Noted: 2018-05-17

## 2018-07-02 PROBLEM — G43.909 MIGRAINE, UNSPECIFIED, NOT INTRACTABLE, WITHOUT STATUS MIGRAINOSUS: Chronic | Status: ACTIVE | Noted: 2018-05-17

## 2018-07-02 PROBLEM — M19.90 UNSPECIFIED OSTEOARTHRITIS, UNSPECIFIED SITE: Chronic | Status: ACTIVE | Noted: 2018-05-17

## 2018-07-11 VITALS
HEART RATE: 61 BPM | TEMPERATURE: 97 F | RESPIRATION RATE: 18 BRPM | DIASTOLIC BLOOD PRESSURE: 59 MMHG | SYSTOLIC BLOOD PRESSURE: 112 MMHG | HEIGHT: 65 IN | OXYGEN SATURATION: 97 % | WEIGHT: 164.69 LBS

## 2018-07-11 NOTE — ASU PATIENT PROFILE, ADULT - PMH
Hyperlipidemia    Hypothyroidism    Migraines    Osteoarthritis    PUD (peptic ulcer disease)  with ugi blding  Rotator cuff disorder  chronic injury left

## 2018-07-12 ENCOUNTER — OUTPATIENT (OUTPATIENT)
Dept: OUTPATIENT SERVICES | Facility: HOSPITAL | Age: 63
LOS: 1 days | Discharge: HOME | End: 2018-07-12
Payer: COMMERCIAL

## 2018-07-12 VITALS
RESPIRATION RATE: 16 BRPM | SYSTOLIC BLOOD PRESSURE: 131 MMHG | DIASTOLIC BLOOD PRESSURE: 71 MMHG | OXYGEN SATURATION: 98 % | HEART RATE: 52 BPM | TEMPERATURE: 98 F

## 2018-07-12 DIAGNOSIS — Z98.49 CATARACT EXTRACTION STATUS, UNSPECIFIED EYE: Chronic | ICD-10-CM

## 2018-07-12 DIAGNOSIS — Z98.890 OTHER SPECIFIED POSTPROCEDURAL STATES: Chronic | ICD-10-CM

## 2018-07-12 DIAGNOSIS — Z87.59 PERSONAL HISTORY OF OTHER COMPLICATIONS OF PREGNANCY, CHILDBIRTH AND THE PUERPERIUM: Chronic | ICD-10-CM

## 2018-07-12 PROCEDURE — 27570 FIXATION OF KNEE JOINT: CPT | Mod: RT

## 2018-07-12 NOTE — BRIEF OPERATIVE NOTE - PROCEDURE
<<-----Click on this checkbox to enter Procedure Manipulation of knee joint under anesthesia  07/12/2018    Active  [email or web address]

## 2023-07-23 NOTE — PATIENT PROFILE ADULT. - NS PRO LAST MENSTRUAL DATE
Occupational Therapy Discharge Summary    Reason for therapy discharge:    All goals and outcomes met, no further needs identified.    Progress towards therapy goal(s). See goals on Care Plan in [facility] electronic health record for goal details.  Goals met    Therapy recommendation(s):    Pt functioning slightly below baseline level with noted impairments in mobility, strength, balance, and spinal precautions, impacting overall I/ADL ind. Pt resides alone, however, pt will have 24/7 supervision and assist for I/ADLs from family as needed. Rec initial assist and supervision with bathing, and heavy IADLs, once pt is medically ready.         pm

## 2024-08-08 NOTE — ASU PREOP CHECKLIST - WEIGHT IN KG
Right cataract:  8/12/2024.  Pre-op telephone interview and instructions reviewed with patient.  Instructed to wash with antibacterial soap, prior to arrival, on the day of surgery.  Instructed to be NPO after midnight.    Post-procedure discharge transport will be provided by friend/Nan.   Patient lives alone.   Post-procedure, friend/Nan, will be staying with patient overnight.  Patient acknowledged understanding to the plan of care and had no further questions/concerns.     Patient aware if they are ill (cough, fever, etc.) to call their physician.   Patient aware of current visitor policy.  Patient aware  parking no longer available.  
74.7